# Patient Record
Sex: MALE | Race: WHITE
[De-identification: names, ages, dates, MRNs, and addresses within clinical notes are randomized per-mention and may not be internally consistent; named-entity substitution may affect disease eponyms.]

---

## 2018-09-13 ENCOUNTER — HOSPITAL ENCOUNTER (EMERGENCY)
Dept: HOSPITAL 74 - JERFT | Age: 28
Discharge: HOME | End: 2018-09-13
Payer: SELF-PAY

## 2018-09-13 VITALS — DIASTOLIC BLOOD PRESSURE: 78 MMHG | TEMPERATURE: 98.1 F | SYSTOLIC BLOOD PRESSURE: 135 MMHG | HEART RATE: 74 BPM

## 2018-09-13 VITALS — BODY MASS INDEX: 31.1 KG/M2
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
## 2018-09-13 DIAGNOSIS — M62.830: Primary | ICD-10-CM

## 2018-09-13 PROCEDURE — 3E0233Z INTRODUCTION OF ANTI-INFLAMMATORY INTO MUSCLE, PERCUTANEOUS APPROACH: ICD-10-PCS | Performed by: NURSE PRACTITIONER

## 2018-09-13 NOTE — PDOC
Rapid Medical Evaluation


Time Seen by Provider: 09/13/18 20:57


Medical Evaluation: 


 Allergies











Allergy/AdvReac Type Severity Reaction Status Date / Time


 


No Known Allergies Allergy   Verified 01/10/16 12:59











09/13/18 20:57


Pt presents for low back pain. 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 He bent 

over to  his tool belt and felt his back pull on the R side. No saddle 

anaesthesia, bladder/bowel incontinence.  No urinary symptoms


Exam: TTP of the paraspinous muscles to the lower back, Normal gait


Orders: Nothing


Pt to proceed to the ED for further evaluation





**Discharge Disposition





- Diagnosis


 Back pain








- Referrals





- Patient Instructions





- Post Discharge Activity

## 2018-09-13 NOTE — PDOC
History of Present Illness





- General


Chief Complaint: Back Pain


Stated Complaint: LOWER PAIN


Time Seen by Provider: 09/13/18 20:57


History Source: Patient


Exam Limitations: No Limitations





- History of Present Illness


Initial Comments: 





09/13/18 22:01


28 year old male presents to ED complaining of low back pain after leaning over 

placing his tool belt in a box at work where he is a . 

Patient has no complaints of saddle anesthesia, weakness of the lower 

extremities, abdominal pain, or incontinence.


Occurred: reports: just prior to arrival


Severity: reports: mild


Pain Location: reports: back


Method of Injury: Yes: unknown


Modifying Factors: improves with: None


Loss of Consciousness: no loss of consciousness


Associated Symptoms (Fall): denies symptoms





Past History





- Travel


Traveled outside of the country in the last 30 days: No





- Past Medical History


Allergies/Adverse Reactions: 


 Allergies











Allergy/AdvReac Type Severity Reaction Status Date / Time


 


No Known Allergies Allergy   Verified 09/13/18 21:01











Home Medications: 


Ambulatory Orders





No Home Medications 0 dose .ROUTE UTDICT 06/13/14 








COPD: No





- Suicide/Smoking/Psychosocial Hx


Smoking Status: No


Smoking History: Current some day smoker


Have you smoked in the past 12 months: Yes


Number of Cigarettes Smoked Daily: 2


Cigars Per Day: 2


Information on smoking cessation initiated: No


Hx Alcohol Use: No


Drug/Substance Use Hx: No


Substance Use Type: None


Patient Lives Alone: No


Lives with/in: spouse/SO





**Review of Systems





- Review of Systems


Able to Perform ROS?: No


Is the patient limited English proficient: Yes


Respiratory: No: Symptoms reported


ABD/GI: No: Symptoms Reported


Musculoskeletal: Yes: Back Pain


Integumentary: No: Symptoms Reported


Neurological: No: Symptoms reported





*Physical Exam





- Vital Signs


 Last Vital Signs











Temp Pulse Resp BP Pulse Ox


 


 98.1 F   74   17   135/78   98 


 


 09/13/18 20:59  09/13/18 20:59  09/13/18 20:59  09/13/18 20:59  09/13/18 20:59














- Physical Exam


General Appearance: Yes: Nourished, Appropriately Dressed.  No: Apparent 

Distress


Neck: positive: Supple.  negative: Decreased range of motion


Gastrointestinal/Abdominal: positive: Soft.  negative: Tenderness


Musculoskeletal: positive: Other (noted right paraspinous tenderness at L2-L4).

  negative: CVA Tenderness, Vertebral Tenderness (no midline tenderness)


Extremity: positive: Normal Inspection, Normal Range of Motion


Integumentary: positive: Normal Color, Warm, Moist


Neurologic: positive: Motor Strength 5/5 (ambulatory)





ED Treatment Course





- Medications


Given in the ED: 


ED Medications














Discontinued Medications














Generic Name Dose Route Start Last Admin





  Trade Name Trentq  PRN Reason Stop Dose Admin


 


Cyclobenzaprine HCl  5 mg  09/13/18 21:32  09/13/18 21:41





  Flexeril -  PO  09/13/18 21:33  5 mg





  ONCE ONE   Administration





     





     





     





     


 


Ketorolac Tromethamine  60 mg  09/13/18 21:32  09/13/18 21:41





  Toradol Injection -  IM  09/13/18 21:33  60 mg





  ONCE ONE   Administration





     





     





     





     














Medical Decision Making





- Medical Decision Making





09/13/18 21:05


Patient with low back pain after straining his back using poor body mechanics 

lifting an item. Patient had no midline tenderness on exam and likely with 

muscle spasm. Patient ordered for Toradol and Flexeril


09/13/18 22:05


States feeling much better wants to be discharged home. Patient be discharged 

home with the same medication.





*DC/Admit/Observation/Transfer


Diagnosis at time of Disposition: 


 Back pain








- Discharge Dispostion


Disposition: HOME


Condition at time of disposition: Improved





- Referrals





- Patient Instructions


Printed Discharge Instructions:  DI for Low Back Pain


Additional Instructions: 


PLease take medication Belgica for discomfort. 


May apply ice to the affected area for the next 3 days.


Please use proper body mechanics to avoid injury.





- Post Discharge Activity


Forms/Work/School Notes:  Back to Work

## 2020-08-07 ENCOUNTER — HOSPITAL ENCOUNTER (EMERGENCY)
Dept: HOSPITAL 74 - JER | Age: 30
LOS: 1 days | Discharge: HOME | End: 2020-08-08
Payer: SELF-PAY

## 2020-08-07 VITALS — BODY MASS INDEX: 29.8 KG/M2

## 2020-08-07 DIAGNOSIS — R10.9: Primary | ICD-10-CM

## 2020-08-07 PROCEDURE — 3E033NZ INTRODUCTION OF ANALGESICS, HYPNOTICS, SEDATIVES INTO PERIPHERAL VEIN, PERCUTANEOUS APPROACH: ICD-10-PCS

## 2020-08-07 PROCEDURE — 3E033GC INTRODUCTION OF OTHER THERAPEUTIC SUBSTANCE INTO PERIPHERAL VEIN, PERCUTANEOUS APPROACH: ICD-10-PCS

## 2020-08-07 PROCEDURE — 3E0337Z INTRODUCTION OF ELECTROLYTIC AND WATER BALANCE SUBSTANCE INTO PERIPHERAL VEIN, PERCUTANEOUS APPROACH: ICD-10-PCS

## 2020-08-07 NOTE — PDOC
History of Present Illness





- General


Chief Complaint: Pain


Stated Complaint: ABD PAIN


Time Seen by Provider: 08/07/20 23:45


History Source: Patient, Spouse


Exam Limitations: Language Barrier





- History of Present Illness


Initial Comments: 


 Tex Peters is 30 Iranian speaking male, with no significant PMH/PSH, 

presents with two days of abdominal pain, vomiting and diarrhea. Hx was provided

by his wife(translation from the patient). Patient reports sudden onset of 

nausea, vomiting and diarrhea, associated with fever and chills. Last meal 

before episode was parmesan chicken sandwich. Temp last night 100.2, took 

Ibuprofen. He reports 2 episodes of vomiting today, yellow, non bilious, non 

bloody. He reports >20 episodes of diarrhea today, yellowish/orange color, no 

gross blood. Patient reports that his diarrhea started black color and turned 

yellow. He endorses an epigastric abdominal pain, non radiating, sharp, worsen 

with food, no resolutions, 8/10. Increased pain with defecations and increased 

flatulence. Patient denies any SOb, chest pain, palpitations, headache, changes 

in vision, difficulty swallowing, weakness, inability to ambulate.


Patient has been tolerating clear liquid diet for the past two days, soups. took

pepto and imodium at home w/o any relief of symptoms.








08/08/20 00:36








Past History





- Travel History


Traveled outside of the country in the last 30 days: No


Close contact w/someone who was outside of country & ill: No





- Medical History


Allergies/Adverse Reactions: 


                                    Allergies











Allergy/AdvReac Type Severity Reaction Status Date / Time


 


No Known Allergies Allergy   Verified 08/07/20 23:38











Home Medications: 


Ambulatory Orders





No Home Medications 0 dose .ROUTE UTDICT 06/13/14 


Cyclobenzaprine HCl [Flexeril -] 5 mg PO TID PRN #12 tablet 09/13/18 


Ibuprofen [Motrin -] 600 mg PO TID PRN #21 tablet 09/13/18 


Famotidine [Pepcid] 20 mg PO BID 14 Days #30 tablet 08/08/20 








COPD: No





- Psycho-Social/Smoking History


Smoking Status: No


Smoking History: Never smoked


Have you smoked in the past 12 months: Yes


Number of Cigarettes Smoked Daily: 2


Cigars Per Day: 2





- Substance Abuse Hx (Audit-C & DAST Scrn)


How often the patient has a drink containing alcohol: Never


Score: In Men: 4 or > Positive; In Women: 3 or > Positive: 0


Screen Result (Pos requires Nsg. Audit-10AR): Negative





**Review of Systems





- Review of Systems


Able to Perform ROS?: Yes


Is the patient limited English proficient: Yes


Constitutional: Yes: Chills, Fever, Loss of Appetite.  No: Weakness


HEENTM: No: Blurred Vision, Throat Pain, Throat Swelling, Difficulty Swallowing


Respiratory: No: Cough, Shortness of Breath, Wheezing


Cardiac (ROS): Yes: Lightheadedness.  No: Chest Pain, Edema, Palpitations, 

Syncope


ABD/GI: Yes: Abd. Pain w/ defecation, Diarrhea, Nausea, Poor Appetite, Poor 

Fluid Intake, Vomiting.  No: Abdominal Distended, Blood Streaked Bowels, 

Constipated, Difficulty Swallowing, Rectal Bleeding


: No: Burning, Dysuria, Flank Pain, Hematuria


Musculoskeletal: No: Back Pain, Muscle Pain, Muscle Weakness


Integumentary: Yes: Rash (b/l upper legs rash, pruritic)


Neurological: No: Headache, Weakness, Dizziness





*Physical Exam





- Vital Signs


                                Last Vital Signs











Temp Pulse Resp BP Pulse Ox


 


 98 F   87   18   130/76   98 


 


 08/07/20 23:34  08/07/20 23:34  08/07/20 23:34  08/07/20 23:34  08/07/20 23:34














- Physical Exam


General Appearance: Yes: Nourished, Appropriately Dressed.  No: Apparent 

Distress


HEENT: positive: EOMI, ISH.  negative: Scleral Icterus (R), Scleral Icterus 

(L), Nasal Congestion, Rhinorrhea


Neck: positive: Supple.  negative: Tender, Rigid, Carotid bruit, Lymphadenopathy

 (R), Lymphadenopathy (L)


Respiratory/Chest: positive: Lungs Clear, Normal Breath Sounds.  negative: 

Respiratory Distress, Crackles, Rales, Rhonchi


Cardiovascular: positive: Regular Rhythm, Regular Rate, S1, S2.  negative: 

Edema, JVD, Murmur


Vascular Pulses: Carotid (R): 2+, Carotid (L): 2+, Dorsalis-Pedis (R): 2+, 

Doralis-Pedis (L): 2+


Gastrointestinal/Abdominal: positive: Normal Bowel Sounds, Soft, Rebound, 

Tenderness (Epigastric, LLQ).  negative: Distended, Guarding


Musculoskeletal: negative: CVA Tenderness


Extremity: negative: Pedal Edema, Calf Tenderness


Integumentary: positive: Normal Color, Warm, Moist, Rash (b/l upper legs, 

multiple diffuse erythamatous papules).  negative: Jaundice


Neurologic: positive: Fully Oriented, Alert, Motor Strength 5/5





ED Treatment Course





- LABORATORY


CBC & Chemistry Diagram: 


                                 08/07/20 23:37





                                 08/07/20 23:37





Medical Decision Making





- Medical Decision Making


30 Iranian speaking male, with no significant PMH/PSH, presents with two days of

 abdominal pain, vomiting and diarrhea





#Gastroenteritis Vs Pancreatitis Vs cholecystitis 


- Fever chills at home


- CBC, CMP, Lipase


- PTT, PT(INR)m Mg, Phos


- CXR


- Abd US: r/o cholecystitis


- Acetaminophen 1000,g, Pepcid 20 mg, Zofran 4mg IV


08/08/20 00:31








Discharge





- Discharge Information


Problems reviewed: Yes


Clinical Impression/Diagnosis: 


 Abdominal pain





Condition: Improved


Disposition: HOME





- Additional Discharge Information


Prescriptions: 


Famotidine [Pepcid] 20 mg PO BID 14 Days #30 tablet





- Follow up/Referral


Referrals: 


[facility] [place] [Provider Group]


Cyrus Matt MD [Staff Physician] - 





- Patient Discharge Instructions


Patient Printed Discharge Instructions:  Acute Abdominal Pain, Nonalcoholic 

Fatty Liver Disease


Additional Instructions: 


You came to ED for abdominal pain. This is most likely from a peptic ulcer. 





At the ED we ray labs, did imaging on your abdomen, and gave you medication for

pain and nausea. The labs showed no acute abnormality. The ultrasound of your 

gallbladder showed mild dilation of your Common bile duct at 5 mm (normal 4mm). 

The CT scan showed fatty liver and a small nodule as well. These should followed

up with a Gastroenterologist who we referred you to. 





Your workup is not complete without following up with Gastroenterologist. 





For pain continue taking pepcid 20 mg two times a day for fourteen days, which 

we sent a prescription to your pharmacy. 





If you have any of the following please return to the ED:


- worsening abdominal pain


- unable to tolerate food by mouth


- blood in vomiting or stool. 


For any emergency please call for medical help right away. 





- Post Discharge Activity

## 2020-08-08 VITALS — HEART RATE: 60 BPM | SYSTOLIC BLOOD PRESSURE: 115 MMHG | DIASTOLIC BLOOD PRESSURE: 58 MMHG | TEMPERATURE: 97.6 F

## 2020-08-08 LAB
ALBUMIN SERPL-MCNC: 4.2 G/DL (ref 3.4–5)
ALP SERPL-CCNC: 88 U/L (ref 45–117)
ALT SERPL-CCNC: 45 U/L (ref 13–61)
ANION GAP SERPL CALC-SCNC: 7 MMOL/L (ref 8–16)
APTT BLD: 36.7 SECONDS (ref 25.2–36.5)
AST SERPL-CCNC: 27 U/L (ref 15–37)
BASOPHILS # BLD: 0.6 % (ref 0–2)
BILIRUB SERPL-MCNC: 0.9 MG/DL (ref 0.2–1)
BUN SERPL-MCNC: 20.6 MG/DL (ref 7–18)
CALCIUM SERPL-MCNC: 9.5 MG/DL (ref 8.5–10.1)
CHLORIDE SERPL-SCNC: 104 MMOL/L (ref 98–107)
CO2 SERPL-SCNC: 24 MMOL/L (ref 21–32)
CREAT SERPL-MCNC: 1.1 MG/DL (ref 0.55–1.3)
DEPRECATED RDW RBC AUTO: 14 % (ref 11.9–15.9)
EOSINOPHIL # BLD: 0.6 % (ref 0–4.5)
GLUCOSE SERPL-MCNC: 94 MG/DL (ref 74–106)
HCT VFR BLD CALC: 43.1 % (ref 35.4–49)
HGB BLD-MCNC: 14.8 GM/DL (ref 11.7–16.9)
INR BLD: 1.08 (ref 0.83–1.09)
LIPASE SERPL-CCNC: 123 U/L (ref 73–393)
LYMPHOCYTES # BLD: 27.9 % (ref 8–40)
MAGNESIUM SERPL-MCNC: 2.3 MG/DL (ref 1.8–2.4)
MCH RBC QN AUTO: 30 PG (ref 25.7–33.7)
MCHC RBC AUTO-ENTMCNC: 34.3 G/DL (ref 32–35.9)
MCV RBC: 87.5 FL (ref 80–96)
MONOCYTES # BLD AUTO: 11.8 % (ref 3.8–10.2)
NEUTROPHILS # BLD: 59.1 % (ref 42.8–82.8)
PHOSPHATE SERPL-MCNC: 4.3 MG/DL (ref 2.5–4.9)
PLATELET # BLD AUTO: 385 K/MM3 (ref 134–434)
PMV BLD: 7.4 FL (ref 7.5–11.1)
POTASSIUM SERPLBLD-SCNC: 4.1 MMOL/L (ref 3.5–5.1)
PROT SERPL-MCNC: 8.3 G/DL (ref 6.4–8.2)
PT PNL PPP: 12.7 SEC (ref 9.7–13)
RBC # BLD AUTO: 4.93 M/MM3 (ref 4–5.6)
SODIUM SERPL-SCNC: 135 MMOL/L (ref 136–145)
WBC # BLD AUTO: 9.1 K/MM3 (ref 4–10)

## 2020-08-08 NOTE — PDOC
Documentation entered by Maeve Jaquez SCRIBE, acting as scribe for 

Cadence Tim MD.








Cadence Tim MD:  This documentation has been prepared by the Leonid oliveira Xhesika, SCRIBE, under my direction and personally reviewed by me in its 

entirety.  I confirm that the documentation accurately reflects all work, 

treatment, procedures, and medical decision making performed by me.  





Attending Attestation





- Resident


Resident Name: Juvenal Becerril





- ED Attending Attestation


I have performed the following: I have examined & evaluated the patient, The 

case was reviewed & discussed with the resident, I agree w/resident's findings &

plan





- HPI


HPI: 





08/07/20 23:57


The patient is a 30 year old male with no significant PMH of who presents to the

emergency department for fever of 100.2 (at home yesterday), chills, abdominal 

pain, 2 episodes of nbnb vomiting and > 20 episodes of diarrhea. Pt states his 

last meal was a parmesan chicken sandwich.





The patient denies chest pain, shortness of breath, headache and dizziness. 

Denies cough, and constipation. Denies dysuria, frequency, urgency and 

hematuria.





Allergies: NKDA








- Physicial Exam


PE: 





08/08/20 01:57


Pt has periumbilical pain; pt has LLQ pain with palpation; pt has RLQ rebound 

pain.


Pt reports a low grade fever at home


Here afebrile.


HR RRR


lungs CTA B


Pt has no flank pain


no C/C/E





- Medical Decision Making





08/08/20 01:58


Pt has a normal abd US


Pt will have a CT scan of his abd pelvis,


08/08/20 01:59


Pt's labs are normal/unremarkable


08/08/20 02:47


Patient Name: CURTIS SOTO


THIS IS A PRELIMINARY REPORT


DATE OF SERVICE: 2020-08-08 00:21:23


IMAGES: 47


EXAM: ABDOMEN US -LIMITED


HISTORY: 30-year-old male epigastric pain nausea vomiting diarrhea assess for 

cholecystitis


COMPARISON: None.


FINDINGS:


Gallbladder is contracted. No cholelithiasis or cholecystitis. Mild 17.3 cm 

hepatomegaly and


steatosis. Common bile duct measures 5 mm. Pancreatic head appears unremarkable.

Pancreatic


tail obscured by bowel gas. No right kidney nephrolithiasis or hydronephrosis. 

Aorta is patent.


IMPRESSION:


Mild hepatomegaly and steatosis.


No cholelithiasis or cholecystitis.


No right kidney nephrolithiasis or hydronephrosis.





08/08/20 03:26


Patient Name: CURTIS SOTO


THIS IS A PRELIMINARY REPORT


DATE OF SERVICE: 2020-08-08 02:12:10


IMAGES: 496


EXAM: CT ABDOMEN WITH CONTRAST CT PELVIS WITH CONTRAST


HISTORY: 30-Year-Old Male With Symptoms Of Left Lower Quadrant Abdominal Pain


COMPARISON: August 8, 2020


Contrast: Omnipaque intravenous contrast was administered


FINDINGS:


Lack of oral contrast limits this exam. Mild basilar atelectasis. Right liver 

lobe ovoid circumscribed


0.8 cm hypodense nonspecific lesion too small to characterize axial image 33. 

Mild liver steatosis.


The gallbladder and pancreas and adrenal glands. No nephrolithiasis or 

hydronephrosis. Nonoral


contrast evaluation of the stomach and small bowel and appendix appear. No 

appendicitis. Colon


appears unremarkable. No free air. No free fluid. No abscess. Bladder and 

prostate appear


unremarkable. Moderate Degenerative disc disease lower lumbar levels. Small 

umbilical hernia


without subcutaneous tissues and completely. Subcentimeter mesenteric lymph 

noted.


Subcentimeter groin lymph nodes noted.


IMPRESSION:


Mild hepatomegaly and steatosis with a right liver lobe ovoid circumscribed 

hypodense nonspecific


lesion too small to characterize. If clinically indicated follow-up outpatient 

evaluation may be


needed.








Stable for d/c home; Outpatient follow up


08/08/20 04:39








Discharge





- Discharge Information


Problems reviewed: Yes


Clinical Impression/Diagnosis: 


 Abdominal pain





Condition: Improved


Disposition: HOME





- Additional Discharge Information


Prescriptions: 


Famotidine [Pepcid] 20 mg PO BID 14 Days #30 tablet





- Follow up/Referral


Referrals: 


[facility] [place] [Provider Group]


Cyrus Matt MD [Staff Physician] - 





- Patient Discharge Instructions


Patient Printed Discharge Instructions:  Acute Abdominal Pain, Nonalcoholic 

Fatty Liver Disease


Additional Instructions: 


You came to ED for abdominal pain. This is most likely from a peptic ulcer. 





At the ED we ray labs, did imaging on your abdomen, and gave you medication for

pain and nausea. The labs showed no acute abnormality. The ultrasound of your 

gallbladder showed mild dilation of your Common bile duct at 5 mm (normal 4mm). 

The CT scan showed fatty liver and a small nodule as well. These should followed

up with a Gastroenterologist who we referred you to. 





Your workup is not complete without following up with Gastroenterologist. 





For pain continue taking pepcid 20 mg two times a day for fourteen days, which 

we sent a prescription to your pharmacy. 





If you have any of the following please return to the ED:


- worsening abdominal pain


- unable to tolerate food by mouth


- blood in vomiting or stool. 


For any emergency please call for medical help right away. 





- Post Discharge Activity

## 2020-08-08 NOTE — PDOC
*Physical Exam





- Vital Signs


                                Last Vital Signs











Temp Pulse Resp BP Pulse Ox


 


 98.5 F   78   18   104/74   97 


 


 08/07/20 23:45  08/07/20 23:45  08/07/20 23:45  08/07/20 23:45  08/07/20 23:45














- Physical Exam





08/08/20 02:09


GENERAL: Awake, alert, and fully oriented, in no acute distress


HEAD: No signs of trauma, normocephalic, atraumatic 


EYES: PERRLA, EOMI, sclera anicteric, conjunctiva clear


ENT: Auricles normal inspection, hearing grossly normal, nares patent


NECK: Normal ROM, supple, no lymphadenopathy, JVD, or masses


LUNGS: No distress, speaks full sentences, clear to auscultation bilaterally    


HEART: Regular rate and rhythm, normal S1 and S2, no murmurs, rubs or gallops, 

peripheral pulses normal and equal bilaterally. 


ABDOMEN: Soft normoactive bowel sounds in all four quadrants mild tenderness to 

palpation in epigastric area


EXTREMITIES : Normal inspection, Normal range of motion, no edema.  No clubbing 

or cyanosis. 


NEUROLOGICAL: Cranial nerves II through XII grossly intact.  Normal speech, 

normal gait, no focal sensorimotor deficits 


SKIN: Warm, Dry, normal turgor, no rashes or lesions noted














ED Treatment Course





- LABORATORY


CBC & Chemistry Diagram: 


                                 08/07/20 23:37





                                 08/07/20 23:37





- ADDITIONAL ORDERS


Additional order review: 


                               Laboratory  Results











  08/08/20 08/07/20 08/07/20





  01:48 23:37 23:37


 


PT with INR   


 


INR   


 


PTT (Actin FS)   


 


Sodium    135 L


 


Potassium    4.1


 


Chloride    104


 


Carbon Dioxide    24


 


Anion Gap    7 L


 


BUN    20.6 H


 


Creatinine    1.1


 


Est GFR (CKD-EPI)AfAm    103.85


 


Est GFR (CKD-EPI)NonAf    89.61


 


Random Glucose    94


 


Calcium    9.5


 


Phosphorus    4.3


 


Magnesium    2.3


 


Total Bilirubin    0.9


 


AST    27


 


ALT    45


 


Alkaline Phosphatase    88


 


Total Protein    8.3 H


 


Albumin    4.2


 


Lipase    123


 


Stool Occult Blood  Negative  


 


Blood Type   O POSITIVE 


 


Antibody Screen   Negative 














  08/07/20





  11:37


 


PT with INR  12.70


 


INR  1.08


 


PTT (Actin FS)  36.7 H


 


Sodium 


 


Potassium 


 


Chloride 


 


Carbon Dioxide 


 


Anion Gap 


 


BUN 


 


Creatinine 


 


Est GFR (CKD-EPI)AfAm 


 


Est GFR (CKD-EPI)NonAf 


 


Random Glucose 


 


Calcium 


 


Phosphorus 


 


Magnesium 


 


Total Bilirubin 


 


AST 


 


ALT 


 


Alkaline Phosphatase 


 


Total Protein 


 


Albumin 


 


Lipase 


 


Stool Occult Blood 


 


Blood Type 


 


Antibody Screen 








                                        











  08/07/20





  23:37


 


RBC  4.93


 


MCV  87.5


 


MCHC  34.3


 


RDW  14.0


 


MPV  7.4 L


 


Neutrophils %  59.1  D


 


Lymphocytes %  27.9  D


 


Monocytes %  11.8 H


 


Eosinophils %  0.6


 


Basophils %  0.6














- Medications


Given in the ED: 


ED Medications














Discontinued Medications














Generic Name Dose Route Start Last Admin





  Trade Name Starr  PRN Reason Stop Dose Admin


 


Acetaminophen  1,000 mg  08/07/20 23:54  08/08/20 00:19





  Ofirmev Injection -  IVPB  08/07/20 23:55  1,000 mg





  ONCE ONE   Administration


 


Sodium Chloride  1,000 mls @ 1,000 mls/hr  08/07/20 23:45  08/08/20 00:13





  Normal Saline -  IV  08/08/20 00:44  1,000 mls/hr





  ASDIR STA   Administration


 


Famotidine/Sodium Chloride  20 mg in 50 mls @ 100 mls/hr  08/08/20 00:11  

08/08/20 01:28





  Pepcid 20 Mg Premixed Ivpb -  IVPB  08/08/20 00:40  100 mls/hr





  ONCE ONE   Administration


 


Ondansetron HCl  4 mg  08/08/20 00:11  08/08/20 01:28





  Zofran Injection  IVPUSH  08/08/20 00:12  4 mg





  ONCE ONE   Administration














Medical Decision Making





- Medical Decision Making


Received Signout from Dr. Becerril. Pt is 31 yo male with no PMH or PSH presenting

to ED for abd pain, n/v/d for days. On physical exam pt had LLQ pain and 

epigastric pain. Pt here received adequate antinausea and pain medication. Pt 

receiving CT scan of abdomen. IF CT scan negative will discharge on two weeks of

pepcid and GI clinic follow up. 


08/08/20 03:33


Feels better and wants to go home. Pt able to tolerate PO challenge. Pt was told

about CT report DC home with GI follow up and pepcid. 











Discharge





- Discharge Information


Problems reviewed: Yes


Clinical Impression/Diagnosis: 


 Abdominal pain





Condition: Improved


Disposition: HOME





- Additional Discharge Information


Prescriptions: 


Famotidine [Pepcid] 20 mg PO BID 14 Days #30 tablet





- Follow up/Referral


Referrals: 


[facility] [place] [Provider Group]


Shaka,Peter K, MD [Staff Physician] - 





- Patient Discharge Instructions


Patient Printed Discharge Instructions:  Acute Abdominal Pain, Nonalcoholic 

Fatty Liver Disease


Additional Instructions: 


You came to ED for abdominal pain. This is most likely from a peptic ulcer. 





At the ED we ray labs, did imaging on your abdomen, and gave you medication for

pain and nausea. The labs showed no acute abnormality. The ultrasound of your 

gallbladder showed mild dilation of your Common bile duct at 5 mm (normal 4mm). 

The CT scan showed fatty liver and a small nodule as well. These should followed

up with a Gastroenterologist who we referred you to. 





Your workup is not complete without following up with Gastroenterologist. 





For pain continue taking pepcid 20 mg two times a day for fourteen days, which ashley wong sent a prescription to your pharmacy. 





If you have any of the following please return to the ED:


- worsening abdominal pain


- unable to tolerate food by mouth


- blood in vomiting or stool. 


For any emergency please call for medical help right away. 





- Post Discharge Activity

## 2021-09-27 ENCOUNTER — HOSPITAL ENCOUNTER (EMERGENCY)
Dept: HOSPITAL 74 - JER | Age: 31
Discharge: HOME | End: 2021-09-27
Payer: SELF-PAY

## 2021-09-27 VITALS — HEART RATE: 66 BPM | TEMPERATURE: 97.7 F | SYSTOLIC BLOOD PRESSURE: 136 MMHG | DIASTOLIC BLOOD PRESSURE: 75 MMHG

## 2021-09-27 VITALS — BODY MASS INDEX: 29.9 KG/M2
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
## 2021-09-27 DIAGNOSIS — S83.412A: Primary | ICD-10-CM
